# Patient Record
Sex: MALE | Race: WHITE | Employment: FULL TIME | URBAN - METROPOLITAN AREA
[De-identification: names, ages, dates, MRNs, and addresses within clinical notes are randomized per-mention and may not be internally consistent; named-entity substitution may affect disease eponyms.]

---

## 2021-09-02 ENCOUNTER — HOSPITAL ENCOUNTER (EMERGENCY)
Facility: CLINIC | Age: 21
Discharge: HOME | End: 2021-09-02
Attending: EMERGENCY MEDICINE | Admitting: EMERGENCY MEDICINE
Payer: COMMERCIAL

## 2021-09-02 VITALS
BODY MASS INDEX: 25.73 KG/M2 | RESPIRATION RATE: 16 BRPM | HEIGHT: 72 IN | SYSTOLIC BLOOD PRESSURE: 129 MMHG | OXYGEN SATURATION: 100 % | TEMPERATURE: 98.3 F | WEIGHT: 190 LBS | HEART RATE: 93 BPM | DIASTOLIC BLOOD PRESSURE: 78 MMHG

## 2021-09-02 DIAGNOSIS — R11.10 VOMITING AND DIARRHEA: ICD-10-CM

## 2021-09-02 DIAGNOSIS — R19.7 VOMITING AND DIARRHEA: ICD-10-CM

## 2021-09-02 LAB
ANION GAP SERPL CALCULATED.3IONS-SCNC: 4 MMOL/L (ref 3–14)
BASOPHILS # BLD AUTO: 0.1 10E3/UL (ref 0–0.2)
BASOPHILS NFR BLD AUTO: 1 %
BUN SERPL-MCNC: 13 MG/DL (ref 7–30)
CALCIUM SERPL-MCNC: 9.3 MG/DL (ref 8.5–10.1)
CHLORIDE BLD-SCNC: 104 MMOL/L (ref 94–109)
CO2 SERPL-SCNC: 27 MMOL/L (ref 20–32)
CREAT SERPL-MCNC: 0.99 MG/DL (ref 0.66–1.25)
EOSINOPHIL # BLD AUTO: 0.1 10E3/UL (ref 0–0.7)
EOSINOPHIL NFR BLD AUTO: 1 %
ERYTHROCYTE [DISTWIDTH] IN BLOOD BY AUTOMATED COUNT: 11.6 % (ref 10–15)
GFR SERPL CREATININE-BSD FRML MDRD: >90 ML/MIN/1.73M2
GLUCOSE BLD-MCNC: 96 MG/DL (ref 70–99)
HCT VFR BLD AUTO: 45 % (ref 40–53)
HGB BLD-MCNC: 16 G/DL (ref 13.3–17.7)
IMM GRANULOCYTES # BLD: 0 10E3/UL
IMM GRANULOCYTES NFR BLD: 0 %
LYMPHOCYTES # BLD AUTO: 2.4 10E3/UL (ref 0.8–5.3)
LYMPHOCYTES NFR BLD AUTO: 26 %
MCH RBC QN AUTO: 30.5 PG (ref 26.5–33)
MCHC RBC AUTO-ENTMCNC: 35.6 G/DL (ref 31.5–36.5)
MCV RBC AUTO: 86 FL (ref 78–100)
MONOCYTES # BLD AUTO: 0.7 10E3/UL (ref 0–1.3)
MONOCYTES NFR BLD AUTO: 8 %
NEUTROPHILS # BLD AUTO: 5.9 10E3/UL (ref 1.6–8.3)
NEUTROPHILS NFR BLD AUTO: 64 %
NRBC # BLD AUTO: 0 10E3/UL
NRBC BLD AUTO-RTO: 0 /100
PLATELET # BLD AUTO: 289 10E3/UL (ref 150–450)
POTASSIUM BLD-SCNC: 3.6 MMOL/L (ref 3.4–5.3)
RBC # BLD AUTO: 5.25 10E6/UL (ref 4.4–5.9)
SODIUM SERPL-SCNC: 135 MMOL/L (ref 133–144)
WBC # BLD AUTO: 9.3 10E3/UL (ref 4–11)

## 2021-09-02 PROCEDURE — 96374 THER/PROPH/DIAG INJ IV PUSH: CPT

## 2021-09-02 PROCEDURE — 99284 EMERGENCY DEPT VISIT MOD MDM: CPT | Mod: 25

## 2021-09-02 PROCEDURE — 258N000003 HC RX IP 258 OP 636: Performed by: EMERGENCY MEDICINE

## 2021-09-02 PROCEDURE — 250N000011 HC RX IP 250 OP 636: Performed by: EMERGENCY MEDICINE

## 2021-09-02 PROCEDURE — 96361 HYDRATE IV INFUSION ADD-ON: CPT

## 2021-09-02 PROCEDURE — 85025 COMPLETE CBC W/AUTO DIFF WBC: CPT | Performed by: EMERGENCY MEDICINE

## 2021-09-02 PROCEDURE — 36415 COLL VENOUS BLD VENIPUNCTURE: CPT | Performed by: EMERGENCY MEDICINE

## 2021-09-02 PROCEDURE — 80048 BASIC METABOLIC PNL TOTAL CA: CPT | Performed by: EMERGENCY MEDICINE

## 2021-09-02 RX ORDER — ONDANSETRON 2 MG/ML
4 INJECTION INTRAMUSCULAR; INTRAVENOUS
Status: COMPLETED | OUTPATIENT
Start: 2021-09-02 | End: 2021-09-02

## 2021-09-02 RX ORDER — ONDANSETRON 4 MG/1
4 TABLET, ORALLY DISINTEGRATING ORAL EVERY 8 HOURS PRN
Qty: 10 TABLET | Refills: 0 | Status: SHIPPED | OUTPATIENT
Start: 2021-09-02 | End: 2021-09-06

## 2021-09-02 RX ADMIN — SODIUM CHLORIDE 1000 ML: 9 INJECTION, SOLUTION INTRAVENOUS at 22:41

## 2021-09-02 RX ADMIN — ONDANSETRON 4 MG: 2 INJECTION INTRAMUSCULAR; INTRAVENOUS at 22:40

## 2021-09-02 ASSESSMENT — ENCOUNTER SYMPTOMS
FEVER: 0
VOMITING: 1
NAUSEA: 1
BLOOD IN STOOL: 1
CHILLS: 0
DIARRHEA: 1
FATIGUE: 1
ABDOMINAL PAIN: 1
DIAPHORESIS: 1

## 2021-09-02 ASSESSMENT — MIFFLIN-ST. JEOR: SCORE: 1899.83

## 2021-09-02 NOTE — LETTER
September 2, 2021      To Whom It May Concern:      Toribio Mullen was seen in our Emergency Department today, 09/02/21.  I expect his condition to improve.  He may return to work but please excuse today through tomorrow due to illness.    Sincerely,        Stuart Kimball MD

## 2021-09-03 NOTE — ED PROVIDER NOTES
"  History   Chief Complaint:  Nausea, Vomiting, & Diarrhea     HPI   Toribio Mullen is a 21 year old male with history of recent diagnosis of Celiac disease and psoriasis who presents with nausea, vomiting, and diarrhea. Here, the patient reported an sudden onset of persistent abdominal pain, nausea, vomiting, and diarrhea today. He woke up this morning diaphoretic with a \"foggy brain\" and fatigue. The patient stated that he ate something that did not sit well with him yesterday since he is on a gluten free diet restriction. He endorses having 5 episodes of dark emesis and watery stools today. He has blood in his stools due to psoriasis. Denies any fevers or chills. Patient had urinated twice today. Of note, the patient takes Humera every two weeks, he has had two colonoscopies and endoscopies in the past. Denies recent antibiotic use.    Review of Systems   Constitutional: Positive for diaphoresis and fatigue. Negative for chills and fever.   Gastrointestinal: Positive for abdominal pain, blood in stool, diarrhea, nausea and vomiting.      Allergies:  No Known Allergies    Medications:  No current outpatient medications on file.      Past Medical History:    No past medical history on file.     Past Surgical History:    No past surgical history on file.     Family History:    No family history on file.    Social History:  Smoking status: no  Alcohol use: occasionally  Drug use: no  Patient presents alone here at the ED. He travels a lot for his work.    Physical Exam     Patient Vitals for the past 24 hrs:   BP Temp Temp src Pulse Resp SpO2 Height Weight   09/02/21 2220 129/78 98.3  F (36.8  C) Oral 93 16 100 % 1.829 m (6') 86.2 kg (190 lb)     Physical Exam  SKIN:  Warm, dry.  HEMATOLOGIC/IMMUNOLOGIC/LYMPHATIC:  No pallor.  EYES:  Conjunctivae normal.  CARDIOVASCULAR:  Regular rate and rhythm.  No murmur.  RESPIRATORY:  No respiratory distress, breath sounds equal and normal.  GASTROINTESTINAL:  Soft, nontender " abdomen with active bowel sounds.  No distention.  No palpable mass or organomegaly.  MUSCULOSKELETAL: Normal body habitus.  NEUROLOGIC:  Alert, conversant.  PSYCHIATRIC:  Normal mood.    Emergency Department Course     Laboratory:  CBC: WBC 9.3, HGB 16.0,   BMP: WNL (Creatinine 0.99)     Emergency Department Course:  Reviewed:  I reviewed nursing notes, vitals, past medical history and care everywhere    Assessments:  2230 I obtained history and examined the patient as noted above.     Interventions:  2240 Zofran 4 mg IV  2241 NS 1 L IV Bolus    Disposition:  The patient was discharged to home.       Impression & Plan     Medical Decision Making:  Toribio Mullen is a 21 year old male who presents at the emergency department with abdominal discomfort with vomiting and diarrhea in the context of his history of Celiac disease. Could be a symptom secondary to the disease or just some other food borne illness. Clinically well appearing with normal vital signs with reassuring abdominal exam. Testing was also reassuring. He improved a good amount with treatment including IV fluids and Zofran. He is well enough to be discharged for further outpatient management of his symptoms. I prescribed additional Zofran and advised to stay hydrated and return feeling crevassing ill or new concerns.     Diagnosis:    ICD-10-CM    1. Vomiting and diarrhea  R11.10     R19.7      Discharge Medications:  New Prescriptions    No medications on file       Scribe Disclosure:  NIC, Trena Knowles, am serving as a scribe at 10:30 PM on 9/2/2021 to document services personally performed by Stuart Kimball MD based on my observations and the provider's statements to me.     Trena Knowles  September 2, 2021     Stuart Kimball MD  09/02/21 [id number]

## 2021-09-03 NOTE — ED TRIAGE NOTES
Pt states he ate something that didn't agree with him, he has Celiac. The last time this happened pt got an IV and rehydrated which worked for him.